# Patient Record
Sex: FEMALE | Race: WHITE | Employment: FULL TIME | URBAN - METROPOLITAN AREA
[De-identification: names, ages, dates, MRNs, and addresses within clinical notes are randomized per-mention and may not be internally consistent; named-entity substitution may affect disease eponyms.]

---

## 2021-07-03 ENCOUNTER — HOSPITAL ENCOUNTER (EMERGENCY)
Age: 21
Discharge: HOME | End: 2021-07-03
Payer: COMMERCIAL

## 2021-07-03 ENCOUNTER — APPOINTMENT (OUTPATIENT)
Dept: CT IMAGING | Age: 21
End: 2021-07-03
Payer: COMMERCIAL

## 2021-07-03 VITALS
TEMPERATURE: 97.5 F | RESPIRATION RATE: 20 BRPM | SYSTOLIC BLOOD PRESSURE: 124 MMHG | OXYGEN SATURATION: 98 % | WEIGHT: 180 LBS | HEART RATE: 75 BPM | HEIGHT: 69 IN | BODY MASS INDEX: 26.66 KG/M2 | DIASTOLIC BLOOD PRESSURE: 71 MMHG

## 2021-07-03 DIAGNOSIS — H92.02 PAIN OF LEFT MASTOID: Primary | ICD-10-CM

## 2021-07-03 LAB
HCG, URINE, POC: NEGATIVE
Lab: NORMAL
NEGATIVE QC PASS/FAIL: NORMAL
POSITIVE QC PASS/FAIL: NORMAL

## 2021-07-03 PROCEDURE — 70450 CT HEAD/BRAIN W/O DYE: CPT

## 2021-07-03 PROCEDURE — 99283 EMERGENCY DEPT VISIT LOW MDM: CPT

## 2021-07-03 RX ORDER — AMOXICILLIN 500 MG/1
500 CAPSULE ORAL 3 TIMES DAILY
Qty: 21 CAPSULE | Refills: 0 | Status: SHIPPED | OUTPATIENT
Start: 2021-07-03 | End: 2021-07-10

## 2021-07-03 RX ORDER — IBUPROFEN 800 MG/1
800 TABLET ORAL EVERY 6 HOURS PRN
Qty: 21 TABLET | Refills: 0 | Status: SHIPPED | OUTPATIENT
Start: 2021-07-03

## 2021-07-03 ASSESSMENT — PAIN SCALES - GENERAL: PAINLEVEL_OUTOF10: 9

## 2021-07-03 ASSESSMENT — PAIN DESCRIPTION - DESCRIPTORS: DESCRIPTORS: BURNING;CONSTANT;SHARP;SHOOTING

## 2021-07-03 ASSESSMENT — PAIN DESCRIPTION - ORIENTATION: ORIENTATION: LEFT

## 2021-07-03 ASSESSMENT — PAIN DESCRIPTION - PAIN TYPE: TYPE: ACUTE PAIN

## 2021-07-03 ASSESSMENT — PAIN DESCRIPTION - LOCATION: LOCATION: EAR

## 2021-07-03 ASSESSMENT — PAIN DESCRIPTION - FREQUENCY: FREQUENCY: CONTINUOUS

## 2021-07-03 NOTE — ED PROVIDER NOTES
[facility]     HPI:  7/3/21,   Time: 1:53 PM EDT         Amanda Whiteside is a 24 y.o. female presenting to the ED for pain behind the left ear, beginning 2 days ago. The complaint has been persistent, mild in severity, and worsened by nothing. Presents for complaints of pain behind the left ear which began 2 days ago. States she was at [facility] yesterday until 4 AM and was told that she had an enlarged lymph node and was advised to take ibuprofen. She denies any fever, injury, any other associated symptoms including any pain to the left ear itself however today began describing a burning sensation to the inside of the ear. Denies any drainage or discharge from the left ear. Denies any nasal congestion rhinorrhea or any other symptoms. ROS:   Pertinent positives and negatives are stated within HPI, all other systems reviewed and are negative.  --------------------------------------------- PAST HISTORY ---------------------------------------------  Past Medical History:  has no past medical history on file. Past Surgical History:  has no past surgical history on file. Social History:  reports that she has never smoked. She has never used smokeless tobacco. She reports that she does not drink alcohol and does not use drugs. Family History: family history is not on file. The patients home medications have been reviewed. Allergies: Patient has no known allergies.     -------------------------------------------------- RESULTS -------------------------------------------------  All laboratory and radiology results have been personally reviewed by myself   LABS:  Results for orders placed or performed during the hospital encounter of 07/03/21   POC Pregnancy Urine   Result Value Ref Range    HCG, Urine, POC Negative Negative    Lot Number [id number]     Positive QC Pass/Fail Pass     Negative QC Pass/Fail Pass        RADIOLOGY:  Interpreted by Radiologist.  [address]   Final Result 1.  No acute intracranial abnormality. Specifically, there is no acute   intracranial hemorrhage. 2.  The mastoid air cells are clear. 3.  1.7 cm mucous retention cyst within the left maxillary sinus.             ------------------------- NURSING NOTES AND VITALS REVIEWED ---------------------------   The nursing notes within the ED encounter and vital signs as below have been reviewed. /71   Pulse 75   Temp 97.5 °F (36.4 °C) (Tympanic)   Resp 20   Ht 5' 9\" (1.753 m)   Wt 180 lb (81.6 kg)   LMP 2021   SpO2 98%   BMI 26.58 kg/m²   Oxygen Saturation Interpretation: Normal      ---------------------------------------------------PHYSICAL EXAM--------------------------------------      Constitutional/General: Alert and oriented x3, well appearing, non toxic in NAD  Head: NC/AT, no erythema, edema, or abnormality noted at the left mastoid however is tender on palpation. Eyes: PERRL, EOMI  Mouth: Oropharynx clear, handling secretions, no trismus, bilateral tympanic membrane of normal appearance with good cone of light. No evidence of exudate. No evidence of infection. Neck: Supple, full ROM, no meningeal signs, no lymphadenopathy to the posterior regular area bilaterally. Pulmonary: Lungs clear to auscultation bilaterally, no wheezes, rales, or rhonchi. Not in respiratory distress  Cardiovascular:  Regular rate and rhythm, no murmurs, gallops, or rubs. 2+ distal pulses  Abdomen: Soft, non tender, non distended,   Extremities: Moves all extremities x 4. Warm and well perfused  Skin: warm and dry without rash  Neurologic: GCS 15,  Psych: Normal Affect      ------------------------------ ED COURSE/MEDICAL DECISION MAKING----------------------  Medications - No data to display      Medical Decision Makin-patient and mother both informed of negative CAT scan results. There is no evidence of a mastoiditis.   There is no bleeding, mass or abnormality in the intracranial area as well.  Patient will be started on ibuprofen as needed for discomfort advised to follow-up with ENT of choice. Mother is requesting an antibiotic just in case her symptoms do not improve and 1 was provided however was advised to not start taking antibiotics for at least 48 more hours if her symptoms do not improve. Counseling: The emergency provider has spoken with the patient and discussed todays results, in addition to providing specific details for the plan of care and counseling regarding the diagnosis and prognosis. Questions are answered at this time and they are agreeable with the plan.      --------------------------------- IMPRESSION AND DISPOSITION ---------------------------------    IMPRESSION  1.  Pain of left mastoid        DISPOSITION  Disposition: Discharge to home  Patient condition is good                 PATTI Gamez CNP  07/03/21 [id number]